# Patient Record
Sex: FEMALE | Race: OTHER | URBAN - METROPOLITAN AREA
[De-identification: names, ages, dates, MRNs, and addresses within clinical notes are randomized per-mention and may not be internally consistent; named-entity substitution may affect disease eponyms.]

---

## 2018-03-29 ENCOUNTER — OFFICE VISIT (OUTPATIENT)
Dept: FAMILY MEDICINE CLINIC | Facility: CLINIC | Age: 33
End: 2018-03-29

## 2018-03-29 VITALS
DIASTOLIC BLOOD PRESSURE: 62 MMHG | HEART RATE: 92 BPM | SYSTOLIC BLOOD PRESSURE: 98 MMHG | BODY MASS INDEX: 27.13 KG/M2 | TEMPERATURE: 99 F | RESPIRATION RATE: 16 BRPM | HEIGHT: 62.5 IN | WEIGHT: 151.19 LBS

## 2018-03-29 DIAGNOSIS — J02.0 STREP THROAT: ICD-10-CM

## 2018-03-29 DIAGNOSIS — J02.9 SORE THROAT: Primary | ICD-10-CM

## 2018-03-29 LAB
CONTROL LINE PRESENT WITH A CLEAR BACKGROUND (YES/NO): YES YES/NO
STREP GRP A CUL-SCR: POSITIVE

## 2018-03-29 PROCEDURE — 87880 STREP A ASSAY W/OPTIC: CPT | Performed by: FAMILY MEDICINE

## 2018-03-29 PROCEDURE — 99214 OFFICE O/P EST MOD 30 MIN: CPT | Performed by: FAMILY MEDICINE

## 2018-03-29 RX ORDER — IBUPROFEN 200 MG
TABLET ORAL
COMMUNITY
Start: 2016-09-19 | End: 2019-08-07

## 2018-03-29 RX ORDER — AZITHROMYCIN 250 MG/1
TABLET, FILM COATED ORAL
Qty: 6 TABLET | Refills: 0 | Status: SHIPPED | OUTPATIENT
Start: 2018-03-29 | End: 2018-04-27 | Stop reason: ALTCHOICE

## 2018-03-29 NOTE — PROGRESS NOTES
[facility] [place]  PROGRESS NOTE  Chief Complaint:   Patient presents with:  Fever: fever, throat is red, sore throat      HPI:   This is a 27year old female presents c/o sore throat and fever for past few days. Denies any nasal congestion.  Iza Champion nodes  ENDOCRINOLOGIC:  Denies excessive sweating, cold or heat intolerance, polyuria or polydipsia. ALLERGIES:  Denies allergic response, history of asthma, sneezing, hives, eczema or rhinitis.      EXAM:   BP 98/62 (BP Location: Right arm, Patient Positi Adequate rest, hydration, salt water gargle and ibuprofen as needed. Start azithromycin. Return to clinic in 1 week if no improvement. Sooner if symptoms gets worse.             Health Maintenance:  Annual Physical due on 07/07/1985  Annual Depression

## 2018-04-27 ENCOUNTER — OFFICE VISIT (OUTPATIENT)
Dept: FAMILY MEDICINE CLINIC | Facility: CLINIC | Age: 33
End: 2018-04-27

## 2018-04-27 ENCOUNTER — APPOINTMENT (OUTPATIENT)
Dept: LAB | Age: 33
End: 2018-04-27
Attending: FAMILY MEDICINE
Payer: COMMERCIAL

## 2018-04-27 VITALS
TEMPERATURE: 98 F | RESPIRATION RATE: 16 BRPM | DIASTOLIC BLOOD PRESSURE: 70 MMHG | HEART RATE: 88 BPM | SYSTOLIC BLOOD PRESSURE: 92 MMHG | WEIGHT: 151.38 LBS | BODY MASS INDEX: 27.86 KG/M2 | HEIGHT: 62 IN

## 2018-04-27 DIAGNOSIS — R10.12 LEFT UPPER QUADRANT PAIN: ICD-10-CM

## 2018-04-27 DIAGNOSIS — R10.12 LEFT UPPER QUADRANT PAIN: Primary | ICD-10-CM

## 2018-04-27 PROCEDURE — 99214 OFFICE O/P EST MOD 30 MIN: CPT | Performed by: FAMILY MEDICINE

## 2018-04-27 PROCEDURE — 83013 H PYLORI (C-13) BREATH: CPT | Performed by: FAMILY MEDICINE

## 2018-04-27 NOTE — PROGRESS NOTES
[facility] [place]  PROGRESS NOTE  Chief Complaint:   Patient presents with:  Abdominal Pain      HPI:   This is a 56 [address] old female coming in for abdominal pain.     She said that for over a year she has had vague left-sided abdominal discomfor as needed Disp:  Rfl:       Counseling given: Not Answered       REVIEW OF SYSTEMS:   CONSTITUTIONAL:  Denies unusual weight gain/loss, fever, chills, or fatigue. EENT:  Eyes:  Denies eye pain, visual loss, blurred vision, double vision or yellow sclerae. well developed, well nourished, no apparent distress.   HEENT:  Head:  Normocephalic, atraumatic Eyes: EOMI, PERRLA, no scleral icterus, conjunctivae clear bilaterally, no eye discharge Ears: External normal. Nose: patent, no nasal discharge Throat:  No ton is to call with any side effects or complications from the treatments as a result of today.      Problem List:  Patient Active Problem List:     Left upper quadrant pain      Deshaun Toledo MD  4/27/2018  3:30 PM

## 2018-04-27 NOTE — PATIENT INSTRUCTIONS
Check test for H pylori. If this is positive, start treatment.    If negative, schedule CT abdomen and pelvis at [facility].

## 2018-04-30 ENCOUNTER — TELEPHONE (OUTPATIENT)
Dept: FAMILY MEDICINE CLINIC | Facility: CLINIC | Age: 33
End: 2018-04-30

## 2018-04-30 RX ORDER — CLARITHROMYCIN 500 MG/1
500 TABLET, COATED ORAL 2 TIMES DAILY
Qty: 28 TABLET | Refills: 0 | Status: SHIPPED | OUTPATIENT
Start: 2018-04-30 | End: 2018-05-14

## 2018-04-30 RX ORDER — METRONIDAZOLE 500 MG/1
500 TABLET ORAL 3 TIMES DAILY
Qty: 42 TABLET | Refills: 0 | Status: SHIPPED | OUTPATIENT
Start: 2018-04-30 | End: 2018-05-14

## 2018-04-30 RX ORDER — PANTOPRAZOLE SODIUM 40 MG/1
40 TABLET, DELAYED RELEASE ORAL
Qty: 30 TABLET | Refills: 1 | Status: SHIPPED | OUTPATIENT
Start: 2018-04-30 | End: 2019-05-30

## 2018-04-30 NOTE — TELEPHONE ENCOUNTER
----- Message from Jessica Matthews MD sent at 4/30/2018  8:11 AM CDT -----  Addendum to the result note:   She is allergic to PCN. Rather than Amoxicillin, change to Metronidazole 500 mg tid for 14 days.

## 2018-04-30 NOTE — TELEPHONE ENCOUNTER
Please call Raquel Arvizu. Her test for H. pylori is positive. This means that she has an infection that I believe is causing her symptoms. This infection is treatable but requires multiple antibiotics for a prolonged period of time.   Plan: Start taking pant

## 2018-04-30 NOTE — TELEPHONE ENCOUNTER
----- Message from Velma Arrieta MD sent at 4/30/2018  7:57 AM CDT -----  Please call Sandra Jeffery. Her test for H. pylori is positive. This means that she has an infection that I believe is causing her symptoms.   This infection is treatable but requires

## 2018-05-22 NOTE — PATIENT INSTRUCTIONS
Adequate rest, hydration, salt water gargle and ibuprofen as needed. Start azithromycin. Return to clinic in 1 week if no improvement. Sooner if symptoms gets worse. romero/yes(specify)

## 2018-07-25 ENCOUNTER — OFFICE VISIT (OUTPATIENT)
Dept: FAMILY MEDICINE CLINIC | Facility: CLINIC | Age: 33
End: 2018-07-25

## 2018-07-25 ENCOUNTER — TELEPHONE (OUTPATIENT)
Dept: FAMILY MEDICINE CLINIC | Facility: CLINIC | Age: 33
End: 2018-07-25

## 2018-07-25 VITALS
BODY MASS INDEX: 27.18 KG/M2 | RESPIRATION RATE: 16 BRPM | DIASTOLIC BLOOD PRESSURE: 66 MMHG | TEMPERATURE: 99 F | HEIGHT: 63 IN | SYSTOLIC BLOOD PRESSURE: 110 MMHG | HEART RATE: 104 BPM | WEIGHT: 153.38 LBS

## 2018-07-25 DIAGNOSIS — K29.70 HELICOBACTER PYLORI GASTRITIS: Primary | ICD-10-CM

## 2018-07-25 DIAGNOSIS — R10.12 LEFT UPPER QUADRANT PAIN: ICD-10-CM

## 2018-07-25 DIAGNOSIS — B96.81 HELICOBACTER PYLORI GASTRITIS: Primary | ICD-10-CM

## 2018-07-25 NOTE — TELEPHONE ENCOUNTER
Patient here because She did not hear back regarding HPylori. See previous phone note. Phone note signed in error by Dr Prosper Yoder at 4:18pm on 4/30. Patient to see Dr Prosper Yoder now to discuss treatment. Confirmed with [facility] not picked up.   Satnam Smith

## 2018-07-25 NOTE — PROGRESS NOTES
[address]  PROGRESS NOTE  Chief Complaint:   Patient presents with: Follow - Up      HPI:   This is a 26year old female coming in for follow-up on her abdominal pain.   Due to an error on our part, she never received the message that h shortness of breath, wheezing, cough or sputum. GASTROINTESTINAL: She has some mild abdominal discomfort now. MUSCULOSKELETAL:  Denies weakness, muscle aches, back pain, joint pain, swelling or stiffness.   NEUROLOGICAL:  Denies headache, seizures, dizzin AND PLAN:   1. Helicobacter pylori gastritis  Her left upper quadrant abdominal pain appears to have been caused by H. pylori gastritis. Plan: Pantoprazole 40 mg twice a day for 2 weeks. Metronidazole 500 mg 3 times a day for 2 weeks.   Clarithromycin 500

## 2018-07-25 NOTE — PATIENT INSTRUCTIONS
Take pantoprazole 40 mg twice a day for 2 weeks. Metronidazole 500 mg 3 times a day for 2 weeks. Clarithromycin 500 mg twice a day for 2 weeks.

## 2018-08-08 ENCOUNTER — OFFICE VISIT (OUTPATIENT)
Dept: FAMILY MEDICINE CLINIC | Facility: CLINIC | Age: 33
End: 2018-08-08
Payer: COMMERCIAL

## 2018-08-08 VITALS
HEART RATE: 86 BPM | RESPIRATION RATE: 14 BRPM | HEIGHT: 63 IN | TEMPERATURE: 98 F | DIASTOLIC BLOOD PRESSURE: 64 MMHG | WEIGHT: 157 LBS | BODY MASS INDEX: 27.82 KG/M2 | SYSTOLIC BLOOD PRESSURE: 122 MMHG

## 2018-08-08 DIAGNOSIS — B96.81 HELICOBACTER PYLORI GASTRITIS: Primary | ICD-10-CM

## 2018-08-08 DIAGNOSIS — R10.12 LEFT UPPER QUADRANT PAIN: ICD-10-CM

## 2018-08-08 DIAGNOSIS — K29.70 HELICOBACTER PYLORI GASTRITIS: Primary | ICD-10-CM

## 2018-08-08 PROCEDURE — 99213 OFFICE O/P EST LOW 20 MIN: CPT | Performed by: FAMILY MEDICINE

## 2018-08-08 NOTE — PROGRESS NOTES
[facility] [place]  PROGRESS NOTE  Chief Complaint:   Patient presents with:  Medication Follow-Up      HPI:   This is a 26year old female coming in for follow-up on her H pylori gastritis.   She has just about completed the two-week course of a chest discomfort, palpitations, edema, dyspnea on exertion or at rest.  RESPIRATORY:  Denies shortness of breath, wheezing, cough or sputum. GASTROINTESTINAL: Her abdominal pain is now improved and she feels much better.   MUSCULOSKELETAL:  Denies weakness normal in all 4 quadrants, no masses, no hepatosplenomegaly. ASSESSMENT AND PLAN:   1. Helicobacter pylori gastritis  She has H. pylori gastritis. It has improved dramatically with the antibiotic treatment protocol.   Plan: Complete the last days of the

## 2019-05-29 ENCOUNTER — TELEPHONE (OUTPATIENT)
Dept: FAMILY MEDICINE CLINIC | Facility: CLINIC | Age: 34
End: 2019-05-29

## 2019-05-29 NOTE — TELEPHONE ENCOUNTER
Please see Care Everywhere - Pt states that she was at [facility] ER and was prescribed Alprazolam 0.5mg tid prn. She states that the medication is helping her anxiety but is making her dizzy. Pt stated that she can see Dr. June Araujo or Dr. Antonino Mayorga tomorrow.  Appt

## 2019-05-30 PROBLEM — F41.9 ANXIETY: Status: ACTIVE | Noted: 2019-05-30

## 2019-05-30 NOTE — PATIENT INSTRUCTIONS
Recommend to stop xanax. Dizziness likely due to xanax. Start lexapro. See Randa Garcia for counseling. Follow up with Dr Nettie Prasad in 2-3 weeks.

## 2019-05-30 NOTE — PROGRESS NOTES
[facility] [place]  PROGRESS NOTE  Chief Complaint:   Patient presents with:  ER F/U: anxiety      HPI:   This is a 26year old female presents to clinic for follow-up on recent ER visit on 5/25/2019 due to anxiety.   Patient received bad news an pain, swelling or stiffness. NEUROLOGICAL:  Denies headache, dizziness, syncope, numbness or tingling. PSYCHIATRIC:   See HPI  ENDOCRINOLOGIC:  Denies excessive sweating, cold or heat intolerance, polyuria or polydipsia.       EXAM:   /64 (BP Locati weeks.             Health Maintenance:  Annual Physical due on 07/07/1985  Annual Depression Screen due on 07/07/1995  Pap Smear,3 Years due on 07/07/2014    Patient/Caregiver Education: Patient/Caregiver Education: There are no barriers to learning.  Medic

## 2019-06-26 ENCOUNTER — OFFICE VISIT (OUTPATIENT)
Dept: FAMILY MEDICINE CLINIC | Facility: CLINIC | Age: 34
End: 2019-06-26
Payer: COMMERCIAL

## 2019-06-26 ENCOUNTER — TELEPHONE (OUTPATIENT)
Dept: FAMILY MEDICINE CLINIC | Facility: CLINIC | Age: 34
End: 2019-06-26

## 2019-06-26 VITALS
WEIGHT: 158.81 LBS | RESPIRATION RATE: 16 BRPM | HEART RATE: 63 BPM | SYSTOLIC BLOOD PRESSURE: 90 MMHG | DIASTOLIC BLOOD PRESSURE: 66 MMHG | TEMPERATURE: 98 F | BODY MASS INDEX: 28 KG/M2 | OXYGEN SATURATION: 97 %

## 2019-06-26 DIAGNOSIS — N63.24 BREAST LUMP ON LEFT SIDE AT 8 O'CLOCK POSITION: Primary | ICD-10-CM

## 2019-06-26 DIAGNOSIS — M25.512 ACUTE PAIN OF LEFT SHOULDER: ICD-10-CM

## 2019-06-26 DIAGNOSIS — F41.9 ANXIETY: ICD-10-CM

## 2019-06-26 PROCEDURE — 99214 OFFICE O/P EST MOD 30 MIN: CPT | Performed by: NURSE PRACTITIONER

## 2019-06-26 NOTE — PROGRESS NOTES
[facility] [place]  PROGRESS NOTE  Chief Complaint:   Patient presents with:  Shoulder Pain: L shoulder and arm pain/numbness  Lump: Lump on L side of chest      HPI:   This is a 26year old female coming in for left breast lump and left armpit/ tablet Rfl: 0   ibuprofen 200 MG Oral Tab two tabs daily as needed Disp:  Rfl:       Counseling given: Not Answered       REVIEW OF SYSTEMS:   CONSTITUTIONAL:  Denies unusual weight gain/loss, fever, chills, or fatigue.   EENT:  Eyes:  Denies eye pain, visu 5/30/19: 63\". Weight as of this encounter: 158 lb 12.8 oz.    Wt Readings from Last 6 Encounters:  06/26/19 : 158 lb 12.8 oz  05/30/19 : 157 lb 12.8 oz  08/08/18 : 157 lb  07/25/18 : 153 lb 6.4 oz  04/27/18 : 151 lb 6 oz  03/29/18 : 151 lb 3.2 oz      V pain of left shoulder  Will try course of antiinflammatory medications and ice for shoulder pain, symptoms improved with use of acetaminophen but patient has not used antiinflammatory medications yet. If no improvement, would proceed to Physical Therapy. note was created utilizing Dragon speech recognition software.  Please excuse any grammatical errors. Call my office if you have any questions regarding this note.

## 2019-06-27 ENCOUNTER — TELEPHONE (OUTPATIENT)
Dept: FAMILY MEDICINE CLINIC | Facility: CLINIC | Age: 34
End: 2019-06-27

## 2019-06-27 DIAGNOSIS — N63.24 BREAST LUMP ON LEFT SIDE AT 8 O'CLOCK POSITION: Primary | ICD-10-CM

## 2019-06-27 NOTE — TELEPHONE ENCOUNTER
Order for bilateral diagnostic mammogram sent yesterday afternoon. Please advise order for breast u/s.

## 2019-06-27 NOTE — TELEPHONE ENCOUNTER
pt called to make appt for mammo- she was told referral/ order needed to be for both breasts not just the left as that is the recommendation  and needs to accompany an ultrasound- please send new order/ US to [phone]- pt would like a call when this is

## 2019-07-03 ENCOUNTER — PATIENT OUTREACH (OUTPATIENT)
Dept: FAMILY MEDICINE CLINIC | Facility: CLINIC | Age: 34
End: 2019-07-03

## 2019-07-03 ENCOUNTER — MED REC SCAN ONLY (OUTPATIENT)
Dept: FAMILY MEDICINE CLINIC | Facility: CLINIC | Age: 34
End: 2019-07-03

## 2019-07-03 NOTE — PATIENT INSTRUCTIONS
Appt for Pap/Physical given with . Mario Pandey, 07/03/19, 3:47 PM    Future appt:     Your appointments     Date & Time Appointment Department [facility])    Aug 07, 2019  4:00 PM CDT Physical - Established Patient with MD Richard Do

## 2019-07-08 ENCOUNTER — TELEPHONE (OUTPATIENT)
Dept: FAMILY MEDICINE CLINIC | Facility: CLINIC | Age: 34
End: 2019-07-08

## 2019-07-08 DIAGNOSIS — N63.20 LEFT BREAST MASS: Primary | ICD-10-CM

## 2019-07-09 NOTE — TELEPHONE ENCOUNTER
See care everywhere telephone encounter from 7/5/19.      Patient has been notified of her mammogram results and has an appointment with Dr. Ho Christopher tomorrow at 8:30am.

## 2019-07-09 NOTE — TELEPHONE ENCOUNTER
Please contact the patient. [facility] mammography department indicated they will contact the patient.     Mammogram ultrasound results reviewed from [facility] with a 0.8cm x 1.6cm x 1.6cm irregular mass in the left breast with an intermediate suspicion for malignanc

## 2019-07-12 ENCOUNTER — TELEPHONE (OUTPATIENT)
Dept: FAMILY MEDICINE CLINIC | Facility: CLINIC | Age: 34
End: 2019-07-12

## 2019-07-12 NOTE — TELEPHONE ENCOUNTER
Wants Roxann to know that patient didn't show up for her biopsy appointment of her left breast. Blu Arenas states that Josey Larson was a little worried about patient so she tried calling patient left several messages but no return call.  Can call if there's any quest

## 2019-07-12 NOTE — TELEPHONE ENCOUNTER
Please call the patient. Last telephone noted indicated she had a follow up appointment the following day. Please reach out to the patient and ask if she went elsewhere.

## 2019-07-18 NOTE — TELEPHONE ENCOUNTER
Left another message on patient's cell phone for a return call. No answer on home number. Letter created for Roxann to read and make revise if needed. Please advise.

## 2019-08-07 ENCOUNTER — OFFICE VISIT (OUTPATIENT)
Dept: FAMILY MEDICINE CLINIC | Facility: CLINIC | Age: 34
End: 2019-08-07
Payer: COMMERCIAL

## 2019-08-07 VITALS
SYSTOLIC BLOOD PRESSURE: 100 MMHG | BODY MASS INDEX: 28.35 KG/M2 | HEART RATE: 97 BPM | DIASTOLIC BLOOD PRESSURE: 66 MMHG | HEIGHT: 63 IN | WEIGHT: 160 LBS | TEMPERATURE: 98 F | OXYGEN SATURATION: 98 % | RESPIRATION RATE: 18 BRPM

## 2019-08-07 DIAGNOSIS — N63.20 BREAST MASS, LEFT: ICD-10-CM

## 2019-08-07 DIAGNOSIS — Z00.00 HEALTHCARE MAINTENANCE: Primary | ICD-10-CM

## 2019-08-07 DIAGNOSIS — F41.9 ANXIETY: ICD-10-CM

## 2019-08-07 PROBLEM — B96.81 HELICOBACTER PYLORI GASTRITIS: Status: RESOLVED | Noted: 2018-07-25 | Resolved: 2019-08-07

## 2019-08-07 PROBLEM — K29.70 HELICOBACTER PYLORI GASTRITIS: Status: RESOLVED | Noted: 2018-07-25 | Resolved: 2019-08-07

## 2019-08-07 PROCEDURE — 88175 CYTOPATH C/V AUTO FLUID REDO: CPT | Performed by: FAMILY MEDICINE

## 2019-08-07 PROCEDURE — 99395 PREV VISIT EST AGE 18-39: CPT | Performed by: FAMILY MEDICINE

## 2019-08-07 NOTE — PROGRESS NOTES
[facility] [place]  PROGRESS NOTE  Chief Complaint:   Patient presents with:  Physical  Pap      HPI:   This is a 37year old female coming in for her annual physical.    She has a long history of anxiety.   She has been feeling more anxious and sclerae. Ears, Nose, Throat:  Denies hearing loss, sneezing, congestion, runny nose or sore throat. INTEGUMENTARY:  Denies rashes, itching, skin lesion, or excessive skin dryness.   CARDIOVASCULAR:  Denies chest pain, chest pressure, chest discomfort, palp dentition. NECK: Supple, no CLAD, no JVD, no thyromegaly. SKIN: No rashes, no skin lesion, no bruising, good turgor. HEART:  Regular rate and rhythm, no murmurs, rubs or gallops. LUNGS: Clear to auscultation bilterally, no rales/rhonchi/wheezing.   [employer] barriers to learning. Medical education done. Outcome: Patient verbalizes understanding. Patient is notified to call with any questions, complications, allergies, or worsening or changing symptoms.   Patient is to call with any side effects or complicatio

## 2019-08-12 LAB
LAST PAP RESULT: NORMAL
PAP HISTORY (OTHER THAN LAST PAP): NORMAL

## 2019-08-13 ENCOUNTER — TELEPHONE (OUTPATIENT)
Dept: FAMILY MEDICINE CLINIC | Facility: CLINIC | Age: 34
End: 2019-08-13

## 2019-08-13 NOTE — TELEPHONE ENCOUNTER
----- Message from Velma Arrieta MD sent at 8/12/2019  4:52 PM CDT -----  Good news. The Pap smear is normal.  Repeat in 3 years.

## 2019-12-17 ENCOUNTER — OFFICE VISIT (OUTPATIENT)
Dept: FAMILY MEDICINE CLINIC | Facility: CLINIC | Age: 34
End: 2019-12-17
Payer: COMMERCIAL

## 2019-12-17 VITALS
HEART RATE: 93 BPM | BODY MASS INDEX: 29.59 KG/M2 | OXYGEN SATURATION: 98 % | SYSTOLIC BLOOD PRESSURE: 120 MMHG | HEIGHT: 63 IN | DIASTOLIC BLOOD PRESSURE: 62 MMHG | TEMPERATURE: 98 F | WEIGHT: 167 LBS

## 2019-12-17 DIAGNOSIS — Z32.01 POSITIVE URINE PREGNANCY TEST: ICD-10-CM

## 2019-12-17 DIAGNOSIS — R10.84 GENERALIZED ABDOMINAL PAIN: ICD-10-CM

## 2019-12-17 DIAGNOSIS — N91.2 AMENORRHEA: Primary | ICD-10-CM

## 2019-12-17 PROCEDURE — 99213 OFFICE O/P EST LOW 20 MIN: CPT | Performed by: NURSE PRACTITIONER

## 2019-12-17 PROCEDURE — 81003 URINALYSIS AUTO W/O SCOPE: CPT | Performed by: NURSE PRACTITIONER

## 2019-12-17 PROCEDURE — 81025 URINE PREGNANCY TEST: CPT | Performed by: NURSE PRACTITIONER

## 2019-12-17 NOTE — PROGRESS NOTES
HPI:    Patient ID: Angela Hernandez is a 37year old female. Patient presents to the clinic today accompanied by her  with complaints of abdominal discomfort.   Patient reports she has not had a period since the beginning of June of this year ar is oriented to person, place, and time. She appears well-developed and well-nourished. No distress. HENT:   Head: Normocephalic and atraumatic. Neck: Normal range of motion. Abdominal: Soft. She exhibits no distension.  Musculoskeletal: Normal range o

## 2019-12-17 NOTE — PATIENT INSTRUCTIONS
Urine pregnancy test in office is positive. Patient given referral to see OB GYN for further treatment. Patient is estimated to be about 26 weeks and 3 days pregnant. Recommend beginning prenatal vitamins ASAP.     Urine dip done today during offic

## 2021-04-19 ENCOUNTER — OFFICE VISIT (OUTPATIENT)
Dept: FAMILY MEDICINE CLINIC | Facility: CLINIC | Age: 36
End: 2021-04-19
Payer: MEDICAID

## 2021-04-19 VITALS
SYSTOLIC BLOOD PRESSURE: 122 MMHG | HEIGHT: 63 IN | TEMPERATURE: 99 F | OXYGEN SATURATION: 99 % | HEART RATE: 88 BPM | WEIGHT: 161 LBS | DIASTOLIC BLOOD PRESSURE: 60 MMHG | RESPIRATION RATE: 16 BRPM | BODY MASS INDEX: 28.53 KG/M2

## 2021-04-19 DIAGNOSIS — R30.0 DYSURIA: Primary | ICD-10-CM

## 2021-04-19 DIAGNOSIS — N30.01 ACUTE CYSTITIS WITH HEMATURIA: ICD-10-CM

## 2021-04-19 PROCEDURE — 81003 URINALYSIS AUTO W/O SCOPE: CPT | Performed by: FAMILY MEDICINE

## 2021-04-19 PROCEDURE — 87186 SC STD MICRODIL/AGAR DIL: CPT | Performed by: FAMILY MEDICINE

## 2021-04-19 PROCEDURE — 87086 URINE CULTURE/COLONY COUNT: CPT | Performed by: FAMILY MEDICINE

## 2021-04-19 PROCEDURE — 3074F SYST BP LT 130 MM HG: CPT | Performed by: FAMILY MEDICINE

## 2021-04-19 PROCEDURE — 87077 CULTURE AEROBIC IDENTIFY: CPT | Performed by: FAMILY MEDICINE

## 2021-04-19 PROCEDURE — 99213 OFFICE O/P EST LOW 20 MIN: CPT | Performed by: FAMILY MEDICINE

## 2021-04-19 PROCEDURE — 3008F BODY MASS INDEX DOCD: CPT | Performed by: FAMILY MEDICINE

## 2021-04-19 PROCEDURE — 3078F DIAST BP <80 MM HG: CPT | Performed by: FAMILY MEDICINE

## 2021-04-19 RX ORDER — CIPROFLOXACIN 250 MG/1
250 TABLET, FILM COATED ORAL 2 TIMES DAILY
Qty: 10 TABLET | Refills: 0 | Status: SHIPPED | OUTPATIENT
Start: 2021-04-19 | End: 2021-04-24

## 2021-04-19 NOTE — PROGRESS NOTES
[facility] [place]  PROGRESS NOTE  Chief Complaint:   Patient presents with:  UTI: pain with urination/blood      HPI:   This is a 38year old female coming in for burning with urination for 3 days. She has not had a fever or chills.   She said sclerae. Ears, Nose, Throat:  Denies hearing loss, sneezing, congestion, runny nose or sore throat. INTEGUMENTARY:  Denies rashes, itching, skin lesion, or excessive skin dryness.   CARDIOVASCULAR:  Denies chest pain, chest pressure, chest discomfort, palp No edema, no cyanosis, no clubbing, FROM, 2+ dorsalis pedis pulses bilaterally. NEURO:  No deficit, normal gait, strength and tone, sensory intact, normal reflexes. ASSESSMENT AND PLAN:   1. Dysuria  She has discomfort with urination.   Urinalysis order

## 2021-04-22 ENCOUNTER — TELEPHONE (OUTPATIENT)
Dept: FAMILY MEDICINE CLINIC | Facility: CLINIC | Age: 36
End: 2021-04-22

## 2021-04-22 PROBLEM — Z16.12 UTI DUE TO EXTENDED-SPECTRUM BETA LACTAMASE (ESBL) PRODUCING ESCHERICHIA COLI: Status: ACTIVE | Noted: 2021-04-22

## 2021-04-22 PROBLEM — N39.0 UTI DUE TO EXTENDED-SPECTRUM BETA LACTAMASE (ESBL) PRODUCING ESCHERICHIA COLI: Status: ACTIVE | Noted: 2021-04-22

## 2021-04-22 PROBLEM — B96.29 UTI DUE TO EXTENDED-SPECTRUM BETA LACTAMASE (ESBL) PRODUCING ESCHERICHIA COLI: Status: ACTIVE | Noted: 2021-04-22

## 2021-04-22 NOTE — TELEPHONE ENCOUNTER
----- Message from KO Thompson sent at 4/22/2021 12:10 PM CDT -----  Patient's urine with ESBL in the urine which is resistant to the medication she was prescribed. Please stop Cipro. Start nitrofurantoin 1 tablet twice a day for seven days.

## 2021-04-22 NOTE — TELEPHONE ENCOUNTER
----- Message from Mouna Sanchez MD sent at 4/21/2021  7:15 PM CDT -----  Urine culture is positive for E. coli but the sensitivities are not yet available. For now, continue the same antibiotic until the sensitivity report is available.

## 2021-04-23 NOTE — TELEPHONE ENCOUNTER
Results and recommendations below discussed with pts  Theresa Beatty (consent on file) He expressed understanding of this information. He will  new prescription today to have ready for her when she gets home from work later this evening.   He has no fur

## 2021-05-02 NOTE — TELEPHONE ENCOUNTER
LVM for pt to r/s 7/23 appt with Dr. Sutton as he is not available that week.     Order faxed. Patient informed.

## (undated) NOTE — LETTER
Date: 5/30/2019    Patient Name: Leilani Pacheco          To Whom it may concern: This letter has been written at the patient's request. The above patient was seen at the [facility] for treatment of a medical condition.     This patient s

## (undated) NOTE — LETTER
Date: 6/26/2019    Patient Name: Adenike Powers          To Whom it may concern: This letter has been written at the patient's request. The above patient was seen at the [facility] for treatment of a medical condition.     This patient s

## (undated) NOTE — LETTER
[address], [address] [place] [id number] [phone]            July 18, 2019      [address]  [address] [facility] [address]  [address] [place] [address]      Dear Liz Dye:    Brigido Glaser received a notice from

## (undated) NOTE — LETTER
[address] [facility], [address] [place] [id number] [phone]            August 19, 2019      [address]  [address] [facility] [address]  [address] [place] [address]      Dear Janes Salazar:     Our office has been trying to

## (undated) NOTE — LETTER
Date: 7/25/2018    Patient Name: Renee Lamb          To Whom it may concern: This letter has been written at the patient's request. The above patient was seen at the [facility] for treatment of a medical condition.     Sigrid glynn